# Patient Record
Sex: MALE | Race: WHITE
[De-identification: names, ages, dates, MRNs, and addresses within clinical notes are randomized per-mention and may not be internally consistent; named-entity substitution may affect disease eponyms.]

---

## 2018-01-01 ENCOUNTER — HOSPITAL ENCOUNTER (INPATIENT)
Dept: HOSPITAL 12 - SRC | Age: 28
LOS: 7 days | Discharge: NURSING, REHAB OR LONG-TERM CARE | DRG: 895 | End: 2018-01-08
Attending: INTERNAL MEDICINE | Admitting: INTERNAL MEDICINE
Payer: COMMERCIAL

## 2018-01-01 VITALS — HEIGHT: 71 IN | WEIGHT: 180 LBS | BODY MASS INDEX: 25.2 KG/M2

## 2018-01-01 VITALS — DIASTOLIC BLOOD PRESSURE: 61 MMHG | SYSTOLIC BLOOD PRESSURE: 116 MMHG

## 2018-01-01 VITALS — DIASTOLIC BLOOD PRESSURE: 75 MMHG | SYSTOLIC BLOOD PRESSURE: 137 MMHG

## 2018-01-01 VITALS — SYSTOLIC BLOOD PRESSURE: 137 MMHG | DIASTOLIC BLOOD PRESSURE: 63 MMHG

## 2018-01-01 DIAGNOSIS — F17.210: ICD-10-CM

## 2018-01-01 DIAGNOSIS — B19.20: ICD-10-CM

## 2018-01-01 DIAGNOSIS — F32.9: ICD-10-CM

## 2018-01-01 DIAGNOSIS — F14.10: ICD-10-CM

## 2018-01-01 DIAGNOSIS — R07.89: ICD-10-CM

## 2018-01-01 DIAGNOSIS — Z82.0: ICD-10-CM

## 2018-01-01 DIAGNOSIS — E87.6: ICD-10-CM

## 2018-01-01 DIAGNOSIS — F41.0: ICD-10-CM

## 2018-01-01 DIAGNOSIS — Z59.1: ICD-10-CM

## 2018-01-01 DIAGNOSIS — G47.00: ICD-10-CM

## 2018-01-01 DIAGNOSIS — F11.229: ICD-10-CM

## 2018-01-01 DIAGNOSIS — F13.232: Primary | ICD-10-CM

## 2018-01-01 DIAGNOSIS — Z91.89: ICD-10-CM

## 2018-01-01 DIAGNOSIS — Z82.49: ICD-10-CM

## 2018-01-01 LAB
ALP SERPL-CCNC: 81 U/L (ref 50–136)
ALT SERPL W/O P-5'-P-CCNC: 19 U/L (ref 16–63)
AMPHETAMINES UR QL SCN>1000 NG/ML: NEGATIVE
AST SERPL-CCNC: 18 U/L (ref 15–37)
BARBITURATES UR QL SCN: NEGATIVE
BASOPHILS # BLD AUTO: 0.1 K/UL (ref 0–8)
BASOPHILS NFR BLD AUTO: 1.2 % (ref 0–2)
BILIRUB SERPL-MCNC: 0.6 MG/DL (ref 0.2–1)
BUN SERPL-MCNC: 12 MG/DL (ref 7–18)
CHLORIDE SERPL-SCNC: 100 MMOL/L (ref 98–107)
CO2 SERPL-SCNC: 29 MMOL/L (ref 21–32)
COCAINE UR QL SCN: NEGATIVE
CREAT SERPL-MCNC: 1 MG/DL (ref 0.6–1.3)
EOSINOPHIL # BLD AUTO: 0.1 K/UL (ref 0–0.7)
EOSINOPHIL NFR BLD AUTO: 1.4 % (ref 0–7)
ETHANOL SERPL-MCNC: < 3 MG/DL (ref 0–0)
GLUCOSE SERPL-MCNC: 102 MG/DL (ref 74–106)
HCT VFR BLD AUTO: 39.2 % (ref 36.7–47.1)
HGB BLD-MCNC: 13.7 G/DL (ref 12.5–16.3)
LYMPHOCYTES # BLD AUTO: 1.8 K/UL (ref 20–40)
LYMPHOCYTES NFR BLD AUTO: 25.5 % (ref 20.5–51.5)
MAGNESIUM SERPL-MCNC: 2.2 MG/DL (ref 1.8–2.4)
MCH RBC QN AUTO: 30.1 UUG (ref 23.8–33.4)
MCHC RBC AUTO-ENTMCNC: 35 G/DL (ref 32.5–36.3)
MCV RBC AUTO: 85.8 FL (ref 73–96.2)
MONOCYTES # BLD AUTO: 0.6 K/UL (ref 2–10)
MONOCYTES NFR BLD AUTO: 8.2 % (ref 0–11)
NEUTROPHILS # BLD AUTO: 4.5 K/UL (ref 1.8–8.9)
NEUTROPHILS NFR BLD AUTO: 63.7 % (ref 38.5–71.5)
OPIATES UR QL SCN: POSITIVE
PCP UR QL SCN>25 NG/ML: NEGATIVE
PLATELET # BLD AUTO: 262 K/UL (ref 152–348)
POTASSIUM SERPL-SCNC: 3.3 MMOL/L (ref 3.5–5.1)
RBC # BLD AUTO: 4.57 MIL/UL (ref 4.06–5.63)
THC UR QL SCN>50 NG/ML: NEGATIVE
WBC # BLD AUTO: 7.1 K/UL (ref 3.6–10.2)
WS STN SPEC: 7.3 G/DL (ref 6.4–8.2)

## 2018-01-01 PROCEDURE — G0480 DRUG TEST DEF 1-7 CLASSES: HCPCS

## 2018-01-01 PROCEDURE — HZ2ZZZZ DETOXIFICATION SERVICES FOR SUBSTANCE ABUSE TREATMENT: ICD-10-PCS | Performed by: INTERNAL MEDICINE

## 2018-01-01 PROCEDURE — A4663 DIALYSIS BLOOD PRESSURE CUFF: HCPCS

## 2018-01-01 RX ADMIN — LORAZEPAM SCH MG: 1 TABLET ORAL at 15:14

## 2018-01-01 RX ADMIN — GABAPENTIN SCH MG: 300 CAPSULE ORAL at 20:20

## 2018-01-01 RX ADMIN — LORAZEPAM SCH MG: 1 TABLET ORAL at 20:20

## 2018-01-01 SDOH — ECONOMIC STABILITY - HOUSING INSECURITY: INADEQUATE HOUSING: Z59.1

## 2018-01-01 NOTE — NUR
PRE ASSESSMENT:

PT IS IN INTAKE. HE PRESENTS WITH PINPOINT PUPILS WITH BLUNTED AFFECT AND DEPRESSED MOOD. HE 
STATES HE HAS NO ALLERGIES. HE STATES HE HAD A SEIZURE WITHIN THE LAST WEEK FROM BZO W/D. HE 
REPORTS USING 6-8 MG OF XANAX DAILY.LAST USED LAST NIGHT. HE REPORTS USING 3-4 GMS OF HEROIN 
IV  DAILY. LAST USED AN HOUR AGO 3 GM IV.HE ALSO STATES HE WAS TAKING 120 MG OXYCODONE DAILY 
BUT STOPPED ON 12/20/17 HE STATES HE TAKES NO MEDS AT HOME AND HAS NO MEDICAL ISSUES. HE 
DENIES HAVING A PCP. WILL ASSESS ON UNIT.

## 2018-01-01 NOTE — NUR
START OF SHIFT NOTE:

Endorsed patient is a 27 year old male admitted for Benzodiazepines, Benzodiazepine and 
Opioid  dependence.  5 day Subutex and 5 day Ativan Taper  ordered.  Patient reports NKA.  
He is on Regular diet, Full Code, Fall and Seizures Precautions.  PMH: Anxiety,  Depression, 
Hep C, History of Seizures r/t withdrawal from substances (last - 12/2017).  Patient is  in 
the room alert and oriented x4.  CIWA 7: Patient c/o anxiety, agitation, depression, 
nervousness, restlessness, sweating, and tremors that can be felt.  VSWNL.  Respirations are 
unlabored and even.  Lungs Sounds are clear throughout.  Patient denies cough, SOB and chest 
pain.  Heart rate is regular.  Abdomen is soft and non-tender.   Bowel Sounds are active in 
all four quadrants.  Encouraged  fluids as tolerated.   PRN  Medications was effective per 
day shift nurse report.  Skin is  intact, warm and dry to touch.  Patient attended group 
activities during day shift per day shift nurse report.   Safety measures in place: Call 
light within reach, bed is locked and lowest positions, padded bed rails up x2.  Patient 
endorsed by day shift nurse.  Report received.  Will continue to monitor closely.


-------------------------------------------------------------------------------

Addendum: 01/02/18 at 0038 by YOLANDA JUÁREZ RN

-------------------------------------------------------------------------------

Endorsed patient is a 27 year old male admitted for Benzodiazepines and Opioid  dependence.

## 2018-01-01 NOTE — NUR
END OF SHIFT:

NEWLY ADMITTED FOR BZO/OPIATE DEPENDENCE. HE IS FEELING S/S OF W/D FROM XANAX BUT DENIES S/S 
OF W/D FROM OPIATES AT THIS TIME AS HE USED HEROIN TODAY AT AROUND 1245. ATIVAN ORDERED BY 
MD AT 1500 AND PT STATES IT WAS EFFECTIVE BUT THEN CIWA BECAME 12 AROUND 1810 AND ATIVAN 2 
MG PO  PRN ADMINISTERED AND CIWA NOW 4. PT IS SNACKING IN BED AND WATCHING TV. WILL ENDORSE 
TO NIGHT SHIFT NURSE.

## 2018-01-01 NOTE — NUR
ADMISSION:

A 27 Y O MALE ADMITTED FOR MEDICALLY SUPERVISED WITHDRAWAL OF BENZODIAZEPINES AND OPIATES. 
PT PRESENTS WITH BLUNTED AFFECT AND DEPRESSED MOOD. HE DENIES S/I AND H/I VS WNL. HE DENIES 
S/I AND H/I. HE REPORTS USING 6-8 MG OF XANAX DAILY.LAST USED LAST NIGHT. HE REPORTS USING 
3-4 GMS OF HEROIN IV  DAILY. LAST USED AROUND 1245 PM 3 GM IV.HE ALSO STATES HE WAS TAKING 
120 MG OXYCODONE DAILY BUT STOPPED ON 12/30/17 HE STATES HE TAKES NO MEDS AT HOME AND HAS NO 
MEDICAL ISSUES. HE DENIES HAVING A PCP. HE STATES HE TOOK DOXEPIN FOR SLEEP IN THE PAST BUT 
TAKES NO HOME MEDS AND BROUGHT NO MEDS TO FACILITY. HE STATES HE FEELS MILD S/S OF BZO W/D 
WHICH INCLUDE ANXIETY,SWEATS AND RESTLESSNESS. ORIENTED PT TRO STAFF AND UNIT. WILL PROVIDE 
SAFE AND SUPPORTIVE ENVIRONMENT.

-------------------------------------------------------------------------------

Addendum: 01/01/18 at 1906 by SUZIE LOPEZ RN

-------------------------------------------------------------------------------

SEIZURE PRECAUTIONS NOTED.

## 2018-01-02 VITALS — SYSTOLIC BLOOD PRESSURE: 100 MMHG | DIASTOLIC BLOOD PRESSURE: 56 MMHG

## 2018-01-02 VITALS — SYSTOLIC BLOOD PRESSURE: 135 MMHG | DIASTOLIC BLOOD PRESSURE: 72 MMHG

## 2018-01-02 VITALS — SYSTOLIC BLOOD PRESSURE: 130 MMHG | DIASTOLIC BLOOD PRESSURE: 87 MMHG

## 2018-01-02 VITALS — DIASTOLIC BLOOD PRESSURE: 70 MMHG | SYSTOLIC BLOOD PRESSURE: 113 MMHG

## 2018-01-02 VITALS — SYSTOLIC BLOOD PRESSURE: 103 MMHG | DIASTOLIC BLOOD PRESSURE: 59 MMHG

## 2018-01-02 VITALS — SYSTOLIC BLOOD PRESSURE: 137 MMHG | DIASTOLIC BLOOD PRESSURE: 75 MMHG

## 2018-01-02 VITALS — DIASTOLIC BLOOD PRESSURE: 70 MMHG | SYSTOLIC BLOOD PRESSURE: 122 MMHG

## 2018-01-02 RX ADMIN — BUPRENORPHINE HYDROCHLORIDE SCH MG: 2 TABLET SUBLINGUAL at 13:11

## 2018-01-02 RX ADMIN — GABAPENTIN SCH MG: 300 CAPSULE ORAL at 08:29

## 2018-01-02 RX ADMIN — DOXEPIN HYDROCHLORIDE SCH MG: 50 CAPSULE ORAL at 22:39

## 2018-01-02 RX ADMIN — LORAZEPAM SCH MG: 1 TABLET ORAL at 16:07

## 2018-01-02 RX ADMIN — LORAZEPAM SCH MG: 1 TABLET ORAL at 08:28

## 2018-01-02 RX ADMIN — BUPRENORPHINE HYDROCHLORIDE SCH MG: 2 TABLET SUBLINGUAL at 16:08

## 2018-01-02 RX ADMIN — BUPRENORPHINE HYDROCHLORIDE SCH MG: 2 TABLET SUBLINGUAL at 09:59

## 2018-01-02 RX ADMIN — BUPRENORPHINE HYDROCHLORIDE SCH MG: 2 TABLET SUBLINGUAL at 08:29

## 2018-01-02 RX ADMIN — LORAZEPAM SCH MG: 1 TABLET ORAL at 12:15

## 2018-01-02 RX ADMIN — BUPRENORPHINE HYDROCHLORIDE SCH MG: 2 TABLET SUBLINGUAL at 20:26

## 2018-01-02 RX ADMIN — GABAPENTIN SCH MG: 300 CAPSULE ORAL at 20:26

## 2018-01-02 RX ADMIN — LORAZEPAM SCH MG: 1 TABLET ORAL at 20:26

## 2018-01-02 NOTE — NUR
Subutex 4 mg SL not administered:

Patient's COWS 10, reports chills/hot flashes, observed with restless legs, pupil dilation, 
anxiety, yawning and stuffy nose. Offered Subutex 4 mg SL as ordered but patient refused. 
Patient states "I am not ready yet, I will wait until lunch time." Educated patient on the 
risk and benefits but patient still refused. Offered 3x, patient continues to refused. 
Respected patient's rights. Will continue to monitor.

## 2018-01-02 NOTE — NUR
End of Shift Notes:

Patient initiated his 5-day Subutex and 5-day Ativan taper as ordered. No adverse reactions 
noted. Patient refused first Subutex dose in AM. VS monitored closely. No significant 
abnormalities noted. Withdrawal symptoms were closely monitored. Initial COWS 10/CIWA 7,  
patient presented with chills, hot flashes, anxiety, agitation, sweats, tremors, pupil 
dilation, yawning and mild myalgia. Last COWS 5/CIWA 4. OT Subutex 2 mg SL given at 1109 
with help. Per patient, Subutex and Ativan has been effective in reducing his withdrawal 
symptoms. PPD administered to patient's right forearm. Tolerated well. No s/s of infection 
noted. Compliant with care and treatment. All needs met and attended. Will continue to 
monitor closely.

## 2018-01-02 NOTE — NUR
Start of Shift Notes:

Received endorsement from night nurse. Patient is in his room. Asleep but easily arousable. 
Respirations even and unlabored. No SOB noted. Skin warm and dry to touch. Abdomen soft and 
non-distended with BS (+) in all 4 quadrants. No complains of N/V/D or constipation noted. 
Bladder non-distended. No complains of dysuria noted. Patient is a 27 year old male admitted 
for BZO and opiate dependence who was placed on a 5-day Subutex and 5-day Ativan taper as 
ordered. No adverse reactions noted. Has past medical hx of Hep C, anxiety and depression. 
NKA. FULL CODE. Regular diet. On fall and seizure precautions. Educated patient on his 
current plan of care for the day and his medication regimen. All needs met and attended. 
Will continue to monitor and encourage group participation to learn new skills to prevent 
relapse.

## 2018-01-02 NOTE — NUR
START OF SHIFT NOTE:

27 year old male admitted for Benzodiazepines and Opioid  dependence continues 5 day Subutex 
and 5 day Ativan Taper which tolerated well  without ASE.  Patient remains compliant with 
treatment, medications, and diet regime.  He is reports NKA, Regular diet,  is on Full Code, 
Fall and Seizures Precautions.  PMH: Anxiety,  Depression, Hepatitis C, History of Seizures 
r/t withdrawal from substances.  Patient is  in the room alert and oriented x4.  COWS 7, 
CIWA 5:  Patient presented with anxiety, agitation, depression, nervousness, restlessness, 
sweating, and tremors.  VSWNL.  Respirations are unlabored and even.  Lungs Sounds are clear 
throughout.  Patient denies cough, SOB and chest pain.  Heart rate is regular, no murmur 
noted.  Abdomen is soft and non-tender.  Bowel Sounds are active in all four quadrants.  PRN 
 Medications was effective per day shift nurse report.  Skin is  intact, warm and dry to 
touch.  Encouraged  fluids as tolerated.   Patient attended group activities during day 
shift per day shift nurse report.   Safety measures in place: Call light within reach, bed 
is locked and lowest positions, padded bed rails up x2.  Patient endorsed by day shift 
nurse.  Report received.  Will continue to monitor closely.

## 2018-01-02 NOTE — NUR
Re-assessment: Subutex 2 mg SL

COWS 6, less chills, hot flashes anxiety, myalgia and less yawning noted. OT Subutex 2 mg SL 
effective. Patient states "I feel alright."

## 2018-01-02 NOTE — NUR
RE-ASSESSMENT

Patient is sleeping.  Respirations even and unlabored.  RR:16.  Sinequan (Doxepin 50 mg  
capsule) 50 mg 1 capsule PO administrated   @2239 was effective.  All needs met.  Safety 
measures on place.  Call light within reach, bed in lowest position and locked, padded rails 
up bilaterally.  Will continue to monitor closely.

## 2018-01-02 NOTE — NUR
Subutex 2 mg SL given:

Patient agreed to take Subutex 2 mg SL x 1 now for COWS 10. Education provided. Risk and 
benefits explained. Will continue to monitor for effectiveness and patient's tolerance to 
medication.

## 2018-01-02 NOTE — NUR
END OF SHIFT NOTE:

Patient is a 27 year old male admitted for Benzodiazepines and Opioid  dependence.  Patient 
is on 5 day Ativan and 5 day Subutex Taper.   Patient remains compliant with treatment, 
medications, and diet regime.  Patient reports NKA, is on Regular diet, Full Code, Fall and 
Seizures Precautions.  PMH: Anxiety,  Depression, Hepatitis C, History of Seizures r/t 
withdrawal from substances.  Last  COWS 6, CIWA 4 @0400: Patient c/o anxiety, agitation, 
depression, nervousness, restlessness, nasal stuff, moist eyes, sweating, and tremors that 
can be felt.  Patient denies SI/HI.  Last VS @0400: T: 97.9, HR: 60, RR:17, RA O2SAT:100%, 
BP:122/70, pain level: "0/10".  Respirations unlabored and even.  Patient denies cough, SOB 
and chest pain.  Skin is intact, warm and dry to touch.   No PRN administrated last night 
shift.  Patient slept 11 hours, intake 240 ml, voided x1.  Encouraged fluids intake as 
tolerated.  Encouraged to attend groups activities.  All needs met.  Safety measures on 
place.  Call light within reach, bed in lowest position and locked, bed rails up 
bilaterally.  Patient endorsed to day shift nurse.  Report given.

## 2018-01-02 NOTE — NUR
SINEQUAN (DOXEPIN 50 MG CAPSULE) 50 MG 1 CAPSULE PO ADMINISTRATION

Patient c/o insomnia.  Sinequan (Doxepin 50 mg  capsule) 50 mg 1 capsule PO administrated 
with full glass of water as ordered.  Patient tolerated well.  All needs met.  Safety 
measures on place.  Call light within reach, bed in lowest position and locked, padded rails 
up bilaterally.  Will continue to monitor closely.

## 2018-01-03 VITALS — SYSTOLIC BLOOD PRESSURE: 126 MMHG | DIASTOLIC BLOOD PRESSURE: 69 MMHG

## 2018-01-03 VITALS — DIASTOLIC BLOOD PRESSURE: 86 MMHG | SYSTOLIC BLOOD PRESSURE: 107 MMHG

## 2018-01-03 VITALS — DIASTOLIC BLOOD PRESSURE: 71 MMHG | SYSTOLIC BLOOD PRESSURE: 121 MMHG

## 2018-01-03 VITALS — DIASTOLIC BLOOD PRESSURE: 67 MMHG | SYSTOLIC BLOOD PRESSURE: 117 MMHG

## 2018-01-03 VITALS — SYSTOLIC BLOOD PRESSURE: 157 MMHG | DIASTOLIC BLOOD PRESSURE: 84 MMHG

## 2018-01-03 VITALS — DIASTOLIC BLOOD PRESSURE: 60 MMHG | SYSTOLIC BLOOD PRESSURE: 130 MMHG

## 2018-01-03 PROCEDURE — HZ31ZZZ INDIVIDUAL COUNSELING FOR SUBSTANCE ABUSE TREATMENT, BEHAVIORAL: ICD-10-PCS

## 2018-01-03 RX ADMIN — LORAZEPAM SCH MG: 1 TABLET ORAL at 08:21

## 2018-01-03 RX ADMIN — BACLOFEN SCH MG: 10 TABLET ORAL at 14:02

## 2018-01-03 RX ADMIN — GABAPENTIN SCH MG: 300 CAPSULE ORAL at 14:02

## 2018-01-03 RX ADMIN — BUPRENORPHINE HYDROCHLORIDE SCH MG: 2 TABLET SUBLINGUAL at 08:21

## 2018-01-03 RX ADMIN — BUPRENORPHINE HYDROCHLORIDE SCH MG: 2 TABLET SUBLINGUAL at 14:02

## 2018-01-03 RX ADMIN — GABAPENTIN SCH MG: 300 CAPSULE ORAL at 08:21

## 2018-01-03 RX ADMIN — LORAZEPAM SCH MG: 1 TABLET ORAL at 20:32

## 2018-01-03 RX ADMIN — DOXEPIN HYDROCHLORIDE SCH MG: 50 CAPSULE ORAL at 20:31

## 2018-01-03 RX ADMIN — BUPRENORPHINE HYDROCHLORIDE SCH MG: 2 TABLET SUBLINGUAL at 20:32

## 2018-01-03 RX ADMIN — BACLOFEN SCH MG: 10 TABLET ORAL at 20:29

## 2018-01-03 RX ADMIN — LORAZEPAM SCH MG: 1 TABLET ORAL at 14:02

## 2018-01-03 RX ADMIN — CLONIDINE HYDROCHLORIDE SCH MG: 0.1 TABLET ORAL at 20:30

## 2018-01-03 NOTE — NUR
Bentyl 20 mg PO/Clonidine 0.1mg PO/Robaxin 750 mg PO given:

Patient noted with complain of 5/10 abdominal cramps, and generalized pain. Noted with 
complains of chills, sweats, hot flashes and anxiety. KS 96 while at rest. /86. 
Non-pharmacological interventions provided but ineffective. Medicated patient with the 
Bentyl 20 mg, Clonidine 0.1mg and Robaxin 750 mg PO as ordered. Will monitor for 
effectiveness.

## 2018-01-03 NOTE — NUR
START OF SHIFT NOTE:

27 year old male admitted for Benzodiazepines and Opioid  dependence continues 5 day Subutex 
and 5 day Ativan Taper which tolerated well  without ASE.  Patient remains compliant with 
treatment, medications, and diet regime.  He is reports NKA, Regular diet,  is on Full Code, 
Fall and Seizures Precautions.  Patient is  alert and oriented x4.  COWS 8, CIWA 7: Patient 
presented with anxiety, agitation, depression, nervousness, restlessness, sweating, and 
tremors.  VSWNL.  Respirations are unlabored and even.  Lungs Sounds are clear throughout.  
Patient denies cough, SOB and chest pain.  Heart rate is regular.  Abdomen is soft and 
non-tender.  Bowel Sounds are active in all four quadrants.  PRN  Medications was effective 
per day shift nurse report.  Skin is  intact, warm and dry to touch.  Encouraged  fluids as 
tolerated.   Patient attended group activities during day shift per day shift nurse report.  
 Safety measures in place: Call light within reach, bed is locked and lowest positions, 
padded bed rails up x2.  Patient endorsed by day shift nurse.  Report received.  Will 
continue to monitor closely.

## 2018-01-03 NOTE — NUR
Re-assessment: Clonidine/Bentyl/Robaxin

COWS 5/CIWA 5. Less chills and hot flashes noted. No sweats noted. Less abdominal cramps 
noted and less myalgia noted. PL 2/10 at this time and patient verbalizes that "pain is 
tolerable." PRNs given were effective.

## 2018-01-03 NOTE — NUR
END OF SHIFT NOTE:

Patient is a 27 year old male admitted for Benzodiazepines and Opioid  dependence, continues 
5 day Ativan and 5 day Subutex Taper which tolerated well without ASE.  Patient reports NKA, 
is on Regular diet, Full Code, Fall and Seizures Precautions.  PMH: Anxiety,  Depression, 
Hepatitis C, History of Seizures r/t withdrawal from substances.  Last COWS 7, CIWA 5 @0400. 
 Patient presented with  following  signs of withdrawal:  anxiety, agitation, depression, 
nervousness, restlessness, nasal stuff, moist eyes, sweating, insomnia, and tremors that can 
be felt.  Patient denies SI/HI.  Last VS @0400: T: 98.1, HR: 60, RR: 17 , RA O2SAT 100%, BP: 
121/71, pain level: "0/10".  Respirations unlabored and even.  Skin remains intact, warm and 
dry to touch.  Sinequan (Doxepin 50 mg  capsule) 50 mg 1 capsule PO administrated for 
insomnia @2239 was effective.  Patient remains compliant with treatment, medications, and 
diet regime.  Patient slept 6 hours, intake 591 ml, voided x3.  Encouraged fluids intake as 
tolerated.  Encouraged to attend groups activities.  All needs met.  Safety measures on 
place.  Call light within reach, bed in lowest position and locked, bed rails up 
bilaterally.  Patient endorsed to day shift nurse.  Report given.

## 2018-01-03 NOTE — NUR
End of Shift Notes:

Patient initiated his 5-day Subutex and 5-day Ativan taper as ordered. No adverse reactions 
noted. VS monitored closely. No significant abnormalities noted. Withdrawal symptoms were 
closely monitored. Initial COWS 6/CIWA 5,  patient presented with chills, hot flashes, 
anxiety, agitation, sweats, fine tremors  and myalgia.  Medicated patient with Bentyl 20 mg 
PO, Clonidine 0.1 mg PO and Robaxin 750 mg PO as ordered at 1100 with help after 1 hour. 
Last COWS 4/CIWA 4. Per patient, Subutex and Ativan has been effective in reducing his 
withdrawal symptoms. Compliant with care and treatment. All needs met and attended. Will 
continue to monitor closely.

## 2018-01-04 VITALS — DIASTOLIC BLOOD PRESSURE: 52 MMHG | SYSTOLIC BLOOD PRESSURE: 124 MMHG

## 2018-01-04 VITALS — SYSTOLIC BLOOD PRESSURE: 100 MMHG | DIASTOLIC BLOOD PRESSURE: 67 MMHG

## 2018-01-04 VITALS — SYSTOLIC BLOOD PRESSURE: 135 MMHG | DIASTOLIC BLOOD PRESSURE: 69 MMHG

## 2018-01-04 VITALS — SYSTOLIC BLOOD PRESSURE: 107 MMHG | DIASTOLIC BLOOD PRESSURE: 62 MMHG

## 2018-01-04 VITALS — DIASTOLIC BLOOD PRESSURE: 62 MMHG | SYSTOLIC BLOOD PRESSURE: 109 MMHG

## 2018-01-04 VITALS — DIASTOLIC BLOOD PRESSURE: 52 MMHG | SYSTOLIC BLOOD PRESSURE: 100 MMHG

## 2018-01-04 RX ADMIN — GABAPENTIN SCH MG: 300 CAPSULE ORAL at 14:22

## 2018-01-04 RX ADMIN — LORAZEPAM SCH MG: 1 TABLET ORAL at 17:15

## 2018-01-04 RX ADMIN — BACLOFEN SCH MG: 20 TABLET ORAL at 21:07

## 2018-01-04 RX ADMIN — BUPRENORPHINE HYDROCHLORIDE SCH MG: 2 TABLET SUBLINGUAL at 14:21

## 2018-01-04 RX ADMIN — BUPRENORPHINE HYDROCHLORIDE SCH MG: 2 TABLET SUBLINGUAL at 21:07

## 2018-01-04 RX ADMIN — GABAPENTIN SCH MG: 300 CAPSULE ORAL at 09:19

## 2018-01-04 RX ADMIN — DOXEPIN HYDROCHLORIDE SCH MG: 50 CAPSULE ORAL at 21:07

## 2018-01-04 RX ADMIN — CLONIDINE HYDROCHLORIDE SCH MG: 0.1 TABLET ORAL at 21:07

## 2018-01-04 RX ADMIN — BACLOFEN SCH MG: 10 TABLET ORAL at 09:19

## 2018-01-04 RX ADMIN — BACLOFEN SCH MG: 20 TABLET ORAL at 14:22

## 2018-01-04 RX ADMIN — CLONIDINE HYDROCHLORIDE SCH MG: 0.1 TABLET ORAL at 14:22

## 2018-01-04 RX ADMIN — LORAZEPAM SCH MG: 1 TABLET ORAL at 13:06

## 2018-01-04 RX ADMIN — CLONIDINE HYDROCHLORIDE SCH MG: 0.1 TABLET ORAL at 09:19

## 2018-01-04 RX ADMIN — GABAPENTIN SCH MG: 300 CAPSULE ORAL at 21:07

## 2018-01-04 NOTE — NUR
Start of Shift 

Patient Received. Patient is currently in activities room participating in a group meeting. 
Patient is a 27 year old male admitted on 1/1/18 for Benzo and Opiate Dependence under the 
care of Dr. Kingston. Patient continues on a modified 4 day Subutex and modified 4 day Ativan 
tapers. Patient verbalizes no known allergies, wishes to be full code, following a Regular 
Diet, placed on fall and seizure precautions, and skin noted intact. Past medical history 
noted as History of Hep C, Anxiety, and Depression. Per endorsement, patients last noted 
COWS 3 anc CIWA 2. No PRN Medications Administered. Will continue plan of care as ordered.

## 2018-01-04 NOTE — NUR
END OF SHIFT NOTE:

Patient is a 27 year old male admitted for Benzodiazepines and Opioid  dependence, continues 
5 day Ativan and 5 day Subutex Taper which tolerated well without ASE.  Patient reports NKA, 
is on Regular diet, Full Code, Fall and Seizures Precautions.  PMH: Anxiety, Depression, 
Hepatitis C,  History of Seizures r/t withdrawal from substances.  Last COWS 5, CIWA 3 
@0400.  Patient presented with  following  signs of withdrawal:  anxiety, agitation, 
depression, nervousness, restlessness, nasal stuff, moist eyes, sweating, insomnia, and 
tremors that can be felt.  Patient denies SI/HI.  Last VS @0400: T: 98.3, HR: 56, RR: 14, RA 
O2SAT 98%, BP: 124/52, pain level: "0/10".  Respirations unlabored and even.  Skin remains 
intact, warm and dry to touch.  Patient remains compliant with treatment, medications, and 
diet regime.  Patient slept 9 hours, intake 1,200 ml, voided x2.  Encouraged fluids intake 
as tolerated.  Encouraged to attend groups activities.  All needs met.  Safety measures on 
place.  Call light within reach, bed in lowest position and locked, bed rails up 
bilaterally.  Patient endorsed to day shift nurse.  Report given.

## 2018-01-04 NOTE — NUR
end of shift note: pt is in stable condition at this time, admitted for opiate/benzo 
withdrawal/dependence. pt tolerated taper well, no A/R NOTED pt's last cows 3 and ciwa 2. 
will endorse pt to night shift nurse

## 2018-01-05 VITALS — DIASTOLIC BLOOD PRESSURE: 73 MMHG | SYSTOLIC BLOOD PRESSURE: 112 MMHG

## 2018-01-05 VITALS — DIASTOLIC BLOOD PRESSURE: 59 MMHG | SYSTOLIC BLOOD PRESSURE: 107 MMHG

## 2018-01-05 VITALS — SYSTOLIC BLOOD PRESSURE: 110 MMHG | DIASTOLIC BLOOD PRESSURE: 79 MMHG

## 2018-01-05 VITALS — DIASTOLIC BLOOD PRESSURE: 56 MMHG | SYSTOLIC BLOOD PRESSURE: 118 MMHG

## 2018-01-05 VITALS — SYSTOLIC BLOOD PRESSURE: 130 MMHG | DIASTOLIC BLOOD PRESSURE: 78 MMHG

## 2018-01-05 VITALS — SYSTOLIC BLOOD PRESSURE: 119 MMHG | DIASTOLIC BLOOD PRESSURE: 66 MMHG

## 2018-01-05 LAB
BUN SERPL-MCNC: 11 MG/DL (ref 7–18)
CHLORIDE SERPL-SCNC: 105 MMOL/L (ref 98–107)
CO2 SERPL-SCNC: 27 MMOL/L (ref 21–32)
CREAT SERPL-MCNC: 1 MG/DL (ref 0.6–1.3)
GLUCOSE SERPL-MCNC: 112 MG/DL (ref 74–106)
MAGNESIUM SERPL-MCNC: 2.1 MG/DL (ref 1.8–2.4)
POTASSIUM SERPL-SCNC: 4 MMOL/L (ref 3.5–5.1)

## 2018-01-05 RX ADMIN — GABAPENTIN SCH MG: 300 CAPSULE ORAL at 14:26

## 2018-01-05 RX ADMIN — CLONIDINE HYDROCHLORIDE SCH MG: 0.1 TABLET ORAL at 08:31

## 2018-01-05 RX ADMIN — BUPRENORPHINE HYDROCHLORIDE SCH MG: 2 TABLET SUBLINGUAL at 08:31

## 2018-01-05 RX ADMIN — BACLOFEN SCH MG: 20 TABLET ORAL at 21:17

## 2018-01-05 RX ADMIN — BACLOFEN SCH MG: 20 TABLET ORAL at 08:30

## 2018-01-05 RX ADMIN — GABAPENTIN SCH MG: 300 CAPSULE ORAL at 21:17

## 2018-01-05 RX ADMIN — LORAZEPAM SCH MG: 1 TABLET ORAL at 08:30

## 2018-01-05 RX ADMIN — LORAZEPAM SCH MG: 1 TABLET ORAL at 14:26

## 2018-01-05 RX ADMIN — GABAPENTIN SCH MG: 300 CAPSULE ORAL at 08:31

## 2018-01-05 RX ADMIN — DOXEPIN HYDROCHLORIDE SCH MG: 50 CAPSULE ORAL at 21:17

## 2018-01-05 RX ADMIN — BUPRENORPHINE HYDROCHLORIDE SCH MG: 2 TABLET SUBLINGUAL at 14:26

## 2018-01-05 RX ADMIN — CLONIDINE HYDROCHLORIDE SCH MG: 0.1 TABLET ORAL at 14:26

## 2018-01-05 RX ADMIN — BUPRENORPHINE HYDROCHLORIDE SCH MG: 2 TABLET SUBLINGUAL at 21:17

## 2018-01-05 RX ADMIN — LORAZEPAM SCH MG: 1 TABLET ORAL at 21:17

## 2018-01-05 RX ADMIN — CLONIDINE HYDROCHLORIDE SCH MG: 0.1 TABLET ORAL at 21:17

## 2018-01-05 RX ADMIN — BACLOFEN SCH MG: 20 TABLET ORAL at 14:26

## 2018-01-05 NOTE — NUR
EKG Results

Patient C/O Chest pain with EKG ordered and no significant findings noted. Relayed to MD 
with no new orders. Routine medications administered accordingly. Will continue to monitor.

## 2018-01-05 NOTE — NUR
Start of Shift 

Patient Received. Patient is in activities room participating in group meeting. Patient 
admitted for Benzo and Opiate and continues on a modified 4 day Subutex and modified 4 day 
Ativan tapers. Per endorsement, No PRN Medications administered. Last noted COWS 4 and CIWA 
3. Will continue to monitor.

## 2018-01-05 NOTE — NUR
End of Shift Notes:

Patient continues to be on Subutex and Ativan taper as ordered. No adverse reactions noted. 
VS monitored closely. No significant abnormalities noted. Withdrawal symptoms were closely 
monitored. Initial COWS 5/CIWA 4, patient presented with anxiety, yawning, myalgia, and 
restlessness. Last COWS 4/ CIWA 3. Per patient, Subutex and Ativan has been effective in 
reducing his withdrawal  symptoms. Patient is compliant with care and treatment. Seen and 
examined by MD Kingston today with no changes in current orders. All needs met and attended. 
Will continue to monitor closely.

## 2018-01-05 NOTE — NUR
MD Communication 

Patient noted verbalizing chest pain. 6/10. Pain does not radiate. No change in LOC with no 
SOB noted. New orders for stat EKG to be rendered. Will continue to monitor.

## 2018-01-05 NOTE — NUR
End of Shift 

Patient is in bed sleeping. Breathing even and non labored. Patient admitted for Benzo and 
Opiate and continues on a modified 4 day Subutex and modified 4 day Ativan tapers. Patient 
remains in stable condition and is tolerating taper medications well with no AR noted. last 
noted COWS 3 anc CIWA 2. No PRN Medications Administered. Will endorse to continue plan of 
care as ordered.

## 2018-01-06 VITALS — SYSTOLIC BLOOD PRESSURE: 113 MMHG | DIASTOLIC BLOOD PRESSURE: 60 MMHG

## 2018-01-06 VITALS — DIASTOLIC BLOOD PRESSURE: 63 MMHG | SYSTOLIC BLOOD PRESSURE: 109 MMHG

## 2018-01-06 VITALS — SYSTOLIC BLOOD PRESSURE: 126 MMHG | DIASTOLIC BLOOD PRESSURE: 81 MMHG

## 2018-01-06 VITALS — SYSTOLIC BLOOD PRESSURE: 106 MMHG | DIASTOLIC BLOOD PRESSURE: 58 MMHG

## 2018-01-06 VITALS — DIASTOLIC BLOOD PRESSURE: 50 MMHG | SYSTOLIC BLOOD PRESSURE: 105 MMHG

## 2018-01-06 VITALS — SYSTOLIC BLOOD PRESSURE: 119 MMHG | DIASTOLIC BLOOD PRESSURE: 62 MMHG

## 2018-01-06 PROCEDURE — HZ41ZZZ GROUP COUNSELING FOR SUBSTANCE ABUSE TREATMENT, BEHAVIORAL: ICD-10-PCS

## 2018-01-06 RX ADMIN — CLONIDINE HYDROCHLORIDE SCH MG: 0.1 TABLET ORAL at 08:44

## 2018-01-06 RX ADMIN — DOXEPIN HYDROCHLORIDE SCH MG: 50 CAPSULE ORAL at 21:37

## 2018-01-06 RX ADMIN — BUPRENORPHINE HYDROCHLORIDE SCH MG: 2 TABLET SUBLINGUAL at 21:38

## 2018-01-06 RX ADMIN — BACLOFEN SCH MG: 20 TABLET ORAL at 14:07

## 2018-01-06 RX ADMIN — CLONIDINE HYDROCHLORIDE SCH MG: 0.1 TABLET ORAL at 14:08

## 2018-01-06 RX ADMIN — CLONIDINE HYDROCHLORIDE SCH MG: 0.1 TABLET ORAL at 21:38

## 2018-01-06 RX ADMIN — GABAPENTIN SCH MG: 400 CAPSULE ORAL at 21:38

## 2018-01-06 RX ADMIN — BACLOFEN SCH MG: 20 TABLET ORAL at 21:37

## 2018-01-06 RX ADMIN — GABAPENTIN SCH MG: 300 CAPSULE ORAL at 14:07

## 2018-01-06 RX ADMIN — BACLOFEN SCH MG: 20 TABLET ORAL at 08:43

## 2018-01-06 RX ADMIN — GABAPENTIN SCH MG: 300 CAPSULE ORAL at 08:43

## 2018-01-06 RX ADMIN — LORAZEPAM SCH MG: 1 TABLET ORAL at 21:37

## 2018-01-06 RX ADMIN — LORAZEPAM SCH MG: 1 TABLET ORAL at 08:43

## 2018-01-06 RX ADMIN — BUPRENORPHINE HYDROCHLORIDE SCH MG: 2 TABLET SUBLINGUAL at 08:43

## 2018-01-06 NOTE — NUR
End of Shift 

Patient is in bed sleeping. Breathing even and non labored. Patient admitted for Benzo and 
Opiate and continues on a modified 4 day Subutex and modified 4 day Ativan tapers. No PRN 
medications administered. Last noted COWS 4 and CIWA 3. Will endorse to continue to monitor.

## 2018-01-06 NOTE — NUR
Start of Shift 

Patient Received. Patient is in activities room participating in group activities. Patient 
admitted for Benzo and Opiate and continues on a modified 4 day Subutex and modified 4 day 
Ativan tapers. No PRN medications administered. Last noted COWS 3 and CIWA 1. Will continue 
to monitor.

## 2018-01-06 NOTE — NUR
Start of Shift Notes:

Received endorsement from night nurse. Patient is in his room. Asleep but easily arousable. 
Respirations even and unlabored. No SOB noted. Skin warm and dry to touch. Abdomen soft and 
non-distended with BS (+) in all 4 quadrants. No complains of N/V/D or constipation noted. 
Bladder non-distended. No complains of dysuria noted. Patient is a 27 year old male admitted 
for BZO and opiate dependence who was placed on a 4-day Subutex and 4-day Ativan taper as 
ordered. No adverse reactions noted. Has past medical hx of Hep C, anxiety and depression. 
NKA. FULL CODE. Regular diet. On fall and seizure precautions. Educated patient on his 
current plan of care for the day and his medication regimen. All needs met and attended. 
Will continue to monitor and encourage group participation to learn new skills to prevent 
relapse.

## 2018-01-07 VITALS — DIASTOLIC BLOOD PRESSURE: 65 MMHG | SYSTOLIC BLOOD PRESSURE: 110 MMHG

## 2018-01-07 VITALS — SYSTOLIC BLOOD PRESSURE: 120 MMHG | DIASTOLIC BLOOD PRESSURE: 60 MMHG

## 2018-01-07 VITALS — SYSTOLIC BLOOD PRESSURE: 134 MMHG | DIASTOLIC BLOOD PRESSURE: 69 MMHG

## 2018-01-07 VITALS — SYSTOLIC BLOOD PRESSURE: 115 MMHG | DIASTOLIC BLOOD PRESSURE: 71 MMHG

## 2018-01-07 VITALS — SYSTOLIC BLOOD PRESSURE: 105 MMHG | DIASTOLIC BLOOD PRESSURE: 57 MMHG

## 2018-01-07 VITALS — DIASTOLIC BLOOD PRESSURE: 66 MMHG | SYSTOLIC BLOOD PRESSURE: 130 MMHG

## 2018-01-07 RX ADMIN — DOXEPIN HYDROCHLORIDE SCH MG: 50 CAPSULE ORAL at 21:20

## 2018-01-07 RX ADMIN — GABAPENTIN SCH MG: 400 CAPSULE ORAL at 21:20

## 2018-01-07 RX ADMIN — GABAPENTIN SCH MG: 400 CAPSULE ORAL at 08:23

## 2018-01-07 RX ADMIN — BACLOFEN SCH MG: 20 TABLET ORAL at 08:23

## 2018-01-07 RX ADMIN — CLONIDINE HYDROCHLORIDE SCH MG: 0.1 TABLET ORAL at 08:23

## 2018-01-07 RX ADMIN — BACLOFEN SCH MG: 20 TABLET ORAL at 14:17

## 2018-01-07 RX ADMIN — GABAPENTIN SCH MG: 400 CAPSULE ORAL at 14:17

## 2018-01-07 RX ADMIN — CLONIDINE HYDROCHLORIDE SCH MG: 0.1 TABLET ORAL at 14:18

## 2018-01-07 RX ADMIN — CLONIDINE HYDROCHLORIDE SCH MG: 0.1 TABLET ORAL at 21:20

## 2018-01-07 RX ADMIN — BACLOFEN SCH MG: 20 TABLET ORAL at 21:20

## 2018-01-07 NOTE — NUR
End of Shift 

Patient is in bed sleeping. Breathing even and non labored. No signs of pain or discomfort 
noted. Patient was admitted for Benzo and Opiate and continues on a modified 4 day Subutex 
and modified 4 day Ativan tapers. No PRN medications administered. Last noted COWS 2 and 
CIWA 2. Will continue to monitor.

## 2018-01-07 NOTE — NUR
Discharged:

Patient left the unit at this time. VS stable. CIWA 0. Patient is in good spirits. Educated 
patient on his discharge instructions, included in the discharge packet were his 
prescriptions, TB test, and all necessary discharge paperwork. PCA cabinet checked. Cassette 
checked. Returned all clothings, medications and valuables were returned to the patient. 
Patient was picked up by [facility] to be transported to [facility]. 

-------------------------------------------------------------------------------

Addendum: 01/07/18 at 1249 by CARL BENDER LVN

-------------------------------------------------------------------------------

Error in charting. Patient was not discharged

## 2018-01-07 NOTE — NUR
START OF SHIFT



Pt is a 28 y/o male admitted on 01/01/18 for benzo and opiate dependence. Pt is full code, 
NKA, regular diet and on fall/seizure precautions. Pt finished a 4 day Ativan and 4 day 
Subutex taper and is scheduled to be d/c tomorrow afternoon. Pt reports PMH of seizure r/t 
withdrawal, hepatitis C, anxiety and depression. Upon assessment pt presents with anxiety, 
fatigue, intermittent sweats, intermittent headache, dysphoria and anhedonia. Pt also 
complains of intermittent right upper quadrant abdominal pain since this afternoon. 
Respirations even and unlabored. Denies N/V/D. Denies A/V hallucinations. Denies chest pain 
or SOB. Medications due. Safety measures in place. Call light within reach. Will continue to 
monitor.

## 2018-01-07 NOTE — NUR
End of Shift Notes:

Patient completed his Subutex and Ativan taper as ordered. No adverse reactions noted. 
Tolerated taper well.  VS monitored closely. No significant abnormalities noted. Withdrawal 
symptoms were closely monitored. Initial COWS 2/CIWA 2, patient presented with anxiety &  
myalgia, . Last COWS 2/ CIWA 1. Per patient, Subutex and Ativan has been effective in 
reducing his withdrawal symptoms. Patient is compliant with care and treatment. Seen and 
examined by MD Kingston today with no changes in current orders. Patient is scheduled to be 
discharged tomorrow.  All needs met and attended. Will continue to monitor closely.

## 2018-01-07 NOTE — NUR
Start of Shift Notes:

Received endorsement from night nurse. Patient is in his room. Alert and oriented x 4. 
Respirations even and unlabored. No SOB noted. Skin warm and dry to touch. Abdomen soft and 
non-distended with BS (+) in all 4 quadrants. No complains of N/V/D or constipation noted. 
Bladder non-distended. No complains of dysuria noted. Patient is a 27 year old male admitted 
for BZO and opiate dependence who was placed on a 4-day Subutex and 4-day Ativan taper as 
ordered. No adverse reactions noted. Has past medical hx of Hep C, anxiety and depression. 
NKA. FULL CODE. Regular diet. On fall and seizure precautions. Educated patient on his 
current plan of care for the day and his medication regimen. All needs met and attended. 
Will continue to monitor and encourage group participation to learn new skills to prevent 
relapse.

## 2018-01-08 VITALS — SYSTOLIC BLOOD PRESSURE: 106 MMHG | DIASTOLIC BLOOD PRESSURE: 69 MMHG

## 2018-01-08 VITALS — SYSTOLIC BLOOD PRESSURE: 121 MMHG | DIASTOLIC BLOOD PRESSURE: 59 MMHG

## 2018-01-08 RX ADMIN — CLONIDINE HYDROCHLORIDE SCH MG: 0.1 TABLET ORAL at 08:38

## 2018-01-08 RX ADMIN — GABAPENTIN SCH MG: 400 CAPSULE ORAL at 08:38

## 2018-01-08 RX ADMIN — BACLOFEN SCH MG: 20 TABLET ORAL at 08:39

## 2018-01-08 NOTE — NUR
Start of Shift

Report from the night nurse: pt is 28 y/o male here for Opiate dependence r/t Heroin 3-4g IV 
daily and Oxycodone 120mg PO daily and Benzo's r/t Xanax 6-8mg PO daily; 4 day Subutex and 
Ativan tapers ordered. Pt is a full code, Regular Diet, NKA, fall and seizure precautions 
ordered. PHx: Multiple relapses, Hep C+, anxiety and Depression. V/S stable. Skin is intact. 
K+ supplements given and are effective. No PRN Meds given last night. Last COWS 1  CIWA 2. 
New orders for the pt to be d/c'd today. Pt is asleep in room. Will cont. to monitor the pt.

## 2018-01-08 NOTE — NUR
Discharge

Pt is A&O x 4 ambulatory independently, PERRLA 3mm, no HA, no N/V or dizziness noted. Pt 
denies chest pain. No SOB ore respiratory distress note. V/S stable. No S/Sx of w/d at this 
time. Skin is intact. Pt is given belongings, written Rx's and d/c summary packet. Pt is 
chaperoned to the [facility] and transported to [facility] with the [facility] 
transport.

## 2018-01-08 NOTE — NUR
COWS/CIWA DEFERRED AND VITALS REFUSED



Pt laying in bed with eyes closed, COWS/CIWA deferred, to be assessed when pt is awake per 
orders. Vitals refused. Respirations 16, even and unlabored. Safety measures in place. Call 
light within reach. Will continue to monitor.

## 2018-01-08 NOTE — NUR
END OF SHIFT



Pt is a 26 y/o male admitted on 01/01/18 for benzo and opiate dependence. Pt is full code, 
NKA, regular diet and on fall/seizure precautions. Pt finished a 4 day Ativan and 4 day 
Subutex taper and is scheduled to be d/c today afternoon. Pt reports PMH of seizure r/t 
withdrawal, hepatitis C, anxiety and depression. Pt presented with anxiety, fatigue, 
intermittent sweats, intermittent headache, dysphoria and anhedonia. Pt also complained of 
intermittent right upper quadrant abdominal pain. Scheduled medications administered, 
effective in S/S of withdrawal as verbalized by pt. Last COWS 1 CIWA 2. Pt slept 6 hours. 
Intake 946 ml, void x 3, stool x 0. Safety measures in place. Call light within reach. Pts 
needs have been met. Endorsed to day shift nurse.